# Patient Record
Sex: FEMALE | Race: WHITE | HISPANIC OR LATINO
[De-identification: names, ages, dates, MRNs, and addresses within clinical notes are randomized per-mention and may not be internally consistent; named-entity substitution may affect disease eponyms.]

---

## 2022-03-24 PROBLEM — Z00.00 ENCOUNTER FOR PREVENTIVE HEALTH EXAMINATION: Status: ACTIVE | Noted: 2022-03-24

## 2022-04-04 ENCOUNTER — NON-APPOINTMENT (OUTPATIENT)
Age: 27
End: 2022-04-04

## 2022-04-04 ENCOUNTER — APPOINTMENT (OUTPATIENT)
Dept: OBGYN | Facility: CLINIC | Age: 27
End: 2022-04-04
Payer: COMMERCIAL

## 2022-04-04 VITALS
HEIGHT: 59 IN | SYSTOLIC BLOOD PRESSURE: 120 MMHG | WEIGHT: 137 LBS | BODY MASS INDEX: 27.62 KG/M2 | DIASTOLIC BLOOD PRESSURE: 80 MMHG

## 2022-04-04 DIAGNOSIS — N89.8 OTHER SPECIFIED NONINFLAMMATORY DISORDERS OF VAGINA: ICD-10-CM

## 2022-04-04 DIAGNOSIS — Z87.2 PERSONAL HISTORY OF DISEASES OF THE SKIN AND SUBCUTANEOUS TISSUE: ICD-10-CM

## 2022-04-04 DIAGNOSIS — N93.9 ABNORMAL UTERINE AND VAGINAL BLEEDING, UNSPECIFIED: ICD-10-CM

## 2022-04-04 PROCEDURE — 99203 OFFICE O/P NEW LOW 30 MIN: CPT

## 2022-04-05 PROBLEM — Z87.2 HISTORY OF ECZEMA: Status: RESOLVED | Noted: 2022-04-05 | Resolved: 2022-04-05

## 2022-04-05 PROBLEM — N89.8 VAGINAL ITCHING: Status: ACTIVE | Noted: 2022-04-05

## 2022-04-05 NOTE — END OF VISIT
[] : Resident [FreeTextEntry3] : Pt seen with resident staff and without resident staff\par Pt has long history of seeing multiple physicians 2/2 AUB and vaginal itching, states over last 2-3yr has seen 5-6 gyn doctors as well as dermatologist and allergist\par Initial concern is diagnosis of PCOS and is concerned that she does not actually have diagnosis. She understands that the diagnosis is a clinical one however she was told in 2019 that due to her abnormal menses and elevated testosterone she had PCOS and at that time was encouraged to start OCP. She initally took NAHED however has migraines with aura and was switched to POP. She states since then she takes the pills continuously and has no menses. She is wondering how the diagnosis can be confirmed, however she understands that the management of PCOS would depend on her goals of care including if she would like to attempt pregnancy. She is getting  in June of this year and may want to consider fertility in next year. \par Her second concern is around vaginal itching. She is very upset about previous doctors "dismissing" her concerns, she has been tested for STI multiple times as well as treated for BV and yeast multiple times. She is monogamous with one male partner and declines STI history. She states the itching is on and off, happens at differnet times of the month, can be triggered by intercourse however is also triggered by nothing at all. She states when itching occurs it is pervasive and she needs to leave work because it is so bothersome. She sometimes scratches so much she has marks and bleeding. She states the itching is not on her vulva it is internal.\par \par Long conversation with patient re: diagnoses.\par 1. PCOS: discussed can perform labs and ultrasound to confirm suspected diagnosis however pt has already been through this workup and is more concerned about the management of her care. She is considering pregnancy in the next year so I recommended she see JS Dr Trejo to better manage her symptoms and prepare her for her options for conception. Disucssed continuing POP for now until she is certain on pregnancy plan. Discussed taking POP not continuously and allowing pill free week every month for a few months to see if it aids in her symptoms of vaginal dryness/itching. Discussed in brief that she has many options for conception and JS will be able to better monitor her symptoms and concerns especially if considering pregnancy soon.\par 2. Vaginal itching/dryness: Discussed possible etiologies including vaginitis, vaginal dryenss, STI, lichen simplex chronicus, dermatologic like eczema. Discussed less likely STI as has been tested as recently as fall 2021 and one monogamous partner. BV panel drawn today however no overt signs of vaginitis today. Discussed possible etiology chronic dryness and recc pill free holiday to assist. Discussed lubricant with intercourse and replens OTC for moisturization. Discussed can consider labial skin biopsy if all else fails to consider diagnosis of lichen simplex chronicus however no labial skin signs on exam and per patient itching is more vaginal than labial.\par \par Plan:\par - f/u affirm\par - referral for JS Dr Trejo\par - reviewed pill free holiday, lubrication, replens\par - RTC if symptoms worsen, will call with results

## 2022-04-05 NOTE — PLAN
[FreeTextEntry1] : I discussed with the patient about diagnosis of PCOS, she is concerned that she might have another diagnosis. I asked about additional symptoms but the patient denies any other complains. The patient is sexually active and would like to be pregnant in the future but not right now.  The patient is currently on progesterone only pills. We discussed alternative contraception methods that are optional for her (hx of migrans with aura)- Depo shot, Nexplanon and IUD.

## 2022-04-05 NOTE — PHYSICAL EXAM
[Chaperone Present] : A chaperone was present in the examining room during all aspects of the physical examination [Appropriately responsive] : appropriately responsive [Soft] : soft [Non-tender] : non-tender [Oriented x3] : oriented x3 [Labia Majora] : normal [Labia Minora] : normal [Normal] : normal [Tenderness] : nontender [FreeTextEntry1] : no erythema no sxcoriations [FreeTextEntry5] : mild clear discharge, affirm collected

## 2022-04-05 NOTE — REVIEW OF SYSTEMS
[Pelvic pain] : pelvic pain [Genital Rash/Irritation] : genital rash/irritation [Negative] : Neurological [de-identified] : vaginal itching

## 2022-04-05 NOTE — HISTORY OF PRESENT ILLNESS
[Patient reported PAP Smear was normal] : Patient reported PAP Smear was normal [Oral Contraceptive] : uses oral contraception pills [Y] : Patient is sexually active [N] : Patient denies prior pregnancies [Menarche Age: ____] : age at menarche was [unfilled] [No] : Patient does not have concerns regarding sex [Currently Active] : currently active [FreeTextEntry1] : Patient presents for an initial office visit. Patient complains of vaginal irritation- she was treated for yeast infection, BV and with steroid cream with no benefit. Additionally the patient received PCOS dx a few years ago, another provider told her that she doesn't have PCOS and she would like to have definite dx. \par  [PapSmeardate] : 8/2021 [de-identified] : Norethindrone

## 2022-04-19 LAB
CANDIDA VAG CYTO: NOT DETECTED
G VAGINALIS+PREV SP MTYP VAG QL MICRO: NOT DETECTED
T VAGINALIS VAG QL WET PREP: NOT DETECTED

## 2022-12-08 ENCOUNTER — TRANSCRIPTION ENCOUNTER (OUTPATIENT)
Age: 27
End: 2022-12-08

## 2022-12-08 ENCOUNTER — APPOINTMENT (OUTPATIENT)
Dept: HUMAN REPRODUCTION | Facility: CLINIC | Age: 27
End: 2022-12-08

## 2022-12-08 PROCEDURE — 76830 TRANSVAGINAL US NON-OB: CPT

## 2022-12-08 PROCEDURE — 36415 COLL VENOUS BLD VENIPUNCTURE: CPT

## 2022-12-08 PROCEDURE — 99205 OFFICE O/P NEW HI 60 MIN: CPT | Mod: 25
